# Patient Record
Sex: MALE | Race: WHITE | NOT HISPANIC OR LATINO | Employment: UNEMPLOYED | URBAN - METROPOLITAN AREA
[De-identification: names, ages, dates, MRNs, and addresses within clinical notes are randomized per-mention and may not be internally consistent; named-entity substitution may affect disease eponyms.]

---

## 2019-12-21 ENCOUNTER — HOSPITAL ENCOUNTER (INPATIENT)
Facility: OTHER | Age: 49
LOS: 1 days | Discharge: HOME | DRG: 918 | End: 2019-12-22
Attending: EMERGENCY MEDICINE | Admitting: EMERGENCY MEDICINE
Payer: COMMERCIAL

## 2019-12-21 DIAGNOSIS — S16.1XXA CERVICAL STRAIN, ACUTE, INITIAL ENCOUNTER: ICD-10-CM

## 2019-12-21 DIAGNOSIS — I48.91 ATRIAL FIBRILLATION: ICD-10-CM

## 2019-12-21 DIAGNOSIS — T14.90XA TRAUMA: ICD-10-CM

## 2019-12-21 DIAGNOSIS — R00.2 PALPITATION: ICD-10-CM

## 2019-12-21 DIAGNOSIS — R00.0 TACHYCARDIA: ICD-10-CM

## 2019-12-21 DIAGNOSIS — S09.90XA INJURY OF HEAD, INITIAL ENCOUNTER: ICD-10-CM

## 2019-12-21 DIAGNOSIS — I48.91 ATRIAL FIBRILLATION WITH RAPID VENTRICULAR RESPONSE: Primary | ICD-10-CM

## 2019-12-21 LAB
ALBUMIN SERPL BCP-MCNC: 4.1 G/DL (ref 3.5–5.2)
ALP SERPL-CCNC: 73 U/L (ref 55–135)
ALT SERPL W/O P-5'-P-CCNC: 61 U/L (ref 10–44)
AMPHET+METHAMPHET UR QL: NEGATIVE
ANION GAP SERPL CALC-SCNC: 18 MMOL/L (ref 8–16)
AST SERPL-CCNC: 31 U/L (ref 10–40)
BARBITURATES UR QL SCN>200 NG/ML: NEGATIVE
BASOPHILS # BLD AUTO: 0.06 K/UL (ref 0–0.2)
BASOPHILS NFR BLD: 0.6 % (ref 0–1.9)
BENZODIAZ UR QL SCN>200 NG/ML: NEGATIVE
BILIRUB SERPL-MCNC: 0.4 MG/DL (ref 0.1–1)
BNP SERPL-MCNC: <10 PG/ML (ref 0–99)
BUN SERPL-MCNC: 17 MG/DL (ref 6–20)
BZE UR QL SCN: NEGATIVE
CALCIUM SERPL-MCNC: 9.7 MG/DL (ref 8.7–10.5)
CANNABINOIDS UR QL SCN: NEGATIVE
CHLORIDE SERPL-SCNC: 105 MMOL/L (ref 95–110)
CO2 SERPL-SCNC: 22 MMOL/L (ref 23–29)
CREAT SERPL-MCNC: 1.1 MG/DL (ref 0.5–1.4)
CREAT UR-MCNC: 112 MG/DL (ref 23–375)
DIFFERENTIAL METHOD: ABNORMAL
EOSINOPHIL # BLD AUTO: 0.3 K/UL (ref 0–0.5)
EOSINOPHIL NFR BLD: 3.6 % (ref 0–8)
ERYTHROCYTE [DISTWIDTH] IN BLOOD BY AUTOMATED COUNT: 12.6 % (ref 11.5–14.5)
EST. GFR  (AFRICAN AMERICAN): >60 ML/MIN/1.73 M^2
EST. GFR  (NON AFRICAN AMERICAN): >60 ML/MIN/1.73 M^2
ETHANOL SERPL-MCNC: 46 MG/DL
GLUCOSE SERPL-MCNC: 116 MG/DL (ref 70–110)
HCT VFR BLD AUTO: 48.1 % (ref 40–54)
HGB BLD-MCNC: 16.6 G/DL (ref 14–18)
IMM GRANULOCYTES # BLD AUTO: 0.02 K/UL (ref 0–0.04)
IMM GRANULOCYTES NFR BLD AUTO: 0.2 % (ref 0–0.5)
LYMPHOCYTES # BLD AUTO: 3.6 K/UL (ref 1–4.8)
LYMPHOCYTES NFR BLD: 37.6 % (ref 18–48)
MCH RBC QN AUTO: 32 PG (ref 27–31)
MCHC RBC AUTO-ENTMCNC: 34.5 G/DL (ref 32–36)
MCV RBC AUTO: 93 FL (ref 82–98)
METHADONE UR QL SCN>300 NG/ML: NEGATIVE
MONOCYTES # BLD AUTO: 0.9 K/UL (ref 0.3–1)
MONOCYTES NFR BLD: 9.2 % (ref 4–15)
NEUTROPHILS # BLD AUTO: 4.7 K/UL (ref 1.8–7.7)
NEUTROPHILS NFR BLD: 48.8 % (ref 38–73)
NRBC BLD-RTO: 0 /100 WBC
OPIATES UR QL SCN: NEGATIVE
PCP UR QL SCN>25 NG/ML: NEGATIVE
PLATELET # BLD AUTO: 283 K/UL (ref 150–350)
PMV BLD AUTO: 9.5 FL (ref 9.2–12.9)
POTASSIUM SERPL-SCNC: 4.1 MMOL/L (ref 3.5–5.1)
PROT SERPL-MCNC: 7.2 G/DL (ref 6–8.4)
RBC # BLD AUTO: 5.18 M/UL (ref 4.6–6.2)
SODIUM SERPL-SCNC: 145 MMOL/L (ref 136–145)
TOXICOLOGY INFORMATION: NORMAL
TROPONIN I SERPL DL<=0.01 NG/ML-MCNC: 0.02 NG/ML (ref 0–0.03)
TROPONIN I SERPL DL<=0.01 NG/ML-MCNC: 0.1 NG/ML (ref 0–0.03)
TSH SERPL DL<=0.005 MIU/L-ACNC: 2.13 UIU/ML (ref 0.4–4)
WBC # BLD AUTO: 9.53 K/UL (ref 3.9–12.7)

## 2019-12-21 PROCEDURE — 84484 ASSAY OF TROPONIN QUANT: CPT | Mod: 91

## 2019-12-21 PROCEDURE — 83880 ASSAY OF NATRIURETIC PEPTIDE: CPT

## 2019-12-21 PROCEDURE — 84443 ASSAY THYROID STIM HORMONE: CPT

## 2019-12-21 PROCEDURE — 20000000 HC ICU ROOM

## 2019-12-21 PROCEDURE — 80320 DRUG SCREEN QUANTALCOHOLS: CPT

## 2019-12-21 PROCEDURE — 96374 THER/PROPH/DIAG INJ IV PUSH: CPT

## 2019-12-21 PROCEDURE — 99223 1ST HOSP IP/OBS HIGH 75: CPT | Mod: ,,, | Performed by: NURSE PRACTITIONER

## 2019-12-21 PROCEDURE — 63600175 PHARM REV CODE 636 W HCPCS: Performed by: NURSE PRACTITIONER

## 2019-12-21 PROCEDURE — 96361 HYDRATE IV INFUSION ADD-ON: CPT

## 2019-12-21 PROCEDURE — 25000003 PHARM REV CODE 250: Performed by: EMERGENCY MEDICINE

## 2019-12-21 PROCEDURE — 25000003 PHARM REV CODE 250: Performed by: NURSE PRACTITIONER

## 2019-12-21 PROCEDURE — 94761 N-INVAS EAR/PLS OXIMETRY MLT: CPT

## 2019-12-21 PROCEDURE — 80053 COMPREHEN METABOLIC PANEL: CPT

## 2019-12-21 PROCEDURE — 85025 COMPLETE CBC W/AUTO DIFF WBC: CPT

## 2019-12-21 PROCEDURE — 80307 DRUG TEST PRSMV CHEM ANLYZR: CPT

## 2019-12-21 PROCEDURE — 93010 ELECTROCARDIOGRAM REPORT: CPT | Mod: ,,, | Performed by: INTERNAL MEDICINE

## 2019-12-21 PROCEDURE — 93005 ELECTROCARDIOGRAM TRACING: CPT

## 2019-12-21 PROCEDURE — 84484 ASSAY OF TROPONIN QUANT: CPT

## 2019-12-21 PROCEDURE — 36415 COLL VENOUS BLD VENIPUNCTURE: CPT

## 2019-12-21 PROCEDURE — 99223 PR INITIAL HOSPITAL CARE,LEVL III: ICD-10-PCS | Mod: ,,, | Performed by: NURSE PRACTITIONER

## 2019-12-21 PROCEDURE — 63600175 PHARM REV CODE 636 W HCPCS: Performed by: EMERGENCY MEDICINE

## 2019-12-21 PROCEDURE — 99291 CRITICAL CARE FIRST HOUR: CPT | Mod: 25

## 2019-12-21 PROCEDURE — 93010 EKG 12-LEAD: ICD-10-PCS | Mod: ,,, | Performed by: INTERNAL MEDICINE

## 2019-12-21 PROCEDURE — 96375 TX/PRO/DX INJ NEW DRUG ADDON: CPT

## 2019-12-21 RX ORDER — DESVENLAFAXINE 100 MG/1
100 TABLET, EXTENDED RELEASE ORAL DAILY
COMMUNITY

## 2019-12-21 RX ORDER — DILTIAZEM HYDROCHLORIDE 5 MG/ML
5 INJECTION INTRAVENOUS
Status: COMPLETED | OUTPATIENT
Start: 2019-12-21 | End: 2019-12-21

## 2019-12-21 RX ORDER — DESVENLAFAXINE SUCCINATE 50 MG/1
100 TABLET, EXTENDED RELEASE ORAL DAILY
Status: DISCONTINUED | OUTPATIENT
Start: 2019-12-22 | End: 2019-12-22 | Stop reason: HOSPADM

## 2019-12-21 RX ORDER — SODIUM CHLORIDE 0.9 % (FLUSH) 0.9 %
10 SYRINGE (ML) INJECTION
Status: DISCONTINUED | OUTPATIENT
Start: 2019-12-21 | End: 2019-12-22 | Stop reason: HOSPADM

## 2019-12-21 RX ORDER — ASPIRIN 325 MG
325 TABLET ORAL
Status: COMPLETED | OUTPATIENT
Start: 2019-12-21 | End: 2019-12-21

## 2019-12-21 RX ORDER — ENOXAPARIN SODIUM 100 MG/ML
1 INJECTION SUBCUTANEOUS EVERY 12 HOURS
Status: DISCONTINUED | OUTPATIENT
Start: 2019-12-21 | End: 2019-12-22

## 2019-12-21 RX ORDER — ACETAMINOPHEN 325 MG/1
650 TABLET ORAL EVERY 4 HOURS PRN
Status: DISCONTINUED | OUTPATIENT
Start: 2019-12-21 | End: 2019-12-22 | Stop reason: HOSPADM

## 2019-12-21 RX ORDER — DILTIAZEM HCL 1 MG/ML
5 INJECTION, SOLUTION INTRAVENOUS
Status: COMPLETED | OUTPATIENT
Start: 2019-12-21 | End: 2019-12-21

## 2019-12-21 RX ORDER — ONDANSETRON 8 MG/1
8 TABLET, ORALLY DISINTEGRATING ORAL EVERY 8 HOURS PRN
Status: DISCONTINUED | OUTPATIENT
Start: 2019-12-21 | End: 2019-12-22 | Stop reason: HOSPADM

## 2019-12-21 RX ORDER — METOPROLOL TARTRATE 1 MG/ML
10 INJECTION, SOLUTION INTRAVENOUS
Status: COMPLETED | OUTPATIENT
Start: 2019-12-21 | End: 2019-12-21

## 2019-12-21 RX ORDER — METOPROLOL TARTRATE 1 MG/ML
INJECTION, SOLUTION INTRAVENOUS
Status: DISPENSED
Start: 2019-12-21 | End: 2019-12-22

## 2019-12-21 RX ORDER — DILTIAZEM HCL 1 MG/ML
5 INJECTION, SOLUTION INTRAVENOUS CONTINUOUS
Status: DISCONTINUED | OUTPATIENT
Start: 2019-12-21 | End: 2019-12-22

## 2019-12-21 RX ORDER — METOPROLOL TARTRATE 1 MG/ML
5 INJECTION, SOLUTION INTRAVENOUS
Status: COMPLETED | OUTPATIENT
Start: 2019-12-21 | End: 2019-12-21

## 2019-12-21 RX ADMIN — SODIUM CHLORIDE 1000 ML: 0.9 INJECTION, SOLUTION INTRAVENOUS at 03:12

## 2019-12-21 RX ADMIN — ASPIRIN 325 MG ORAL TABLET 325 MG: 325 PILL ORAL at 03:12

## 2019-12-21 RX ADMIN — Medication 2.5 MG/HR: at 07:12

## 2019-12-21 RX ADMIN — METOPROLOL TARTRATE 5 MG: 1 INJECTION, SOLUTION INTRAVENOUS at 03:12

## 2019-12-21 RX ADMIN — ENOXAPARIN SODIUM 80 MG: 100 INJECTION SUBCUTANEOUS at 08:12

## 2019-12-21 RX ADMIN — DILTIAZEM HYDROCHLORIDE 5 MG: 5 INJECTION INTRAVENOUS at 05:12

## 2019-12-21 RX ADMIN — METOPROLOL TARTRATE 10 MG: 1 INJECTION, SOLUTION INTRAVENOUS at 04:12

## 2019-12-21 NOTE — ED PROVIDER NOTES
Encounter Date: 12/21/2019    SCRIBE #1 NOTE: I, Андрей Mcguiren, am scribing for, and in the presence of,  Dr. Anne. I have scribed the entire note.       History     Chief Complaint   Patient presents with    Palpitations     Pt reports palpitations since this morning. 202 HR noted in triage.      Time patient was seen by the provider: 3:39 PM    The patient is a 49 y.o. male who presents to the ED with a complaint of palpations today. He reports he was eating lunch today when he felt his heart palpations start. He took a nap with no relief of his symptoms so he came here. He does not smoke or endorse any drug use. He has an abrasion on his forehead and states he fell last night. He denies any loss of consciousness. He is not complaining of a headache. He did not drink coffee or take any caffeine pills.  He denies fever, chills, cough, congestion, or any other symptoms.     The history is provided by the patient.     Review of patient's allergies indicates:  Allergies not on file  No past medical history on file.  No past surgical history on file.  No family history on file.  Social History     Tobacco Use    Smoking status: Not on file   Substance Use Topics    Alcohol use: Not on file    Drug use: Not on file     Review of Systems   Constitutional: Negative for chills and fever.   HENT: Negative for congestion and sore throat.    Eyes: Negative for photophobia and redness.   Respiratory: Negative for cough and shortness of breath.    Cardiovascular: Positive for palpitations.   Gastrointestinal: Negative for abdominal pain, nausea and vomiting.   Genitourinary: Negative for difficulty urinating and dysuria.   Musculoskeletal: Negative for back pain and neck pain.   Skin: Negative for rash.   Neurological: Negative for weakness, light-headedness and headaches.   Psychiatric/Behavioral: Negative for confusion.       Physical Exam     Initial Vitals [12/21/19 1530]   BP Pulse Resp Temp SpO2   (!) 144/77 (!) 183  20 98.4 °F (36.9 °C) 96 %      MAP       --         Physical Exam    Nursing note and vitals reviewed.  Constitutional: He appears well-developed and well-nourished. He is not diaphoretic. No distress.   HENT:   Head: Normocephalic and atraumatic.   Mouth/Throat: Oropharynx is clear and moist.   Moist mucus membranes. TMs clear and intact bilaterally.    Eyes: Conjunctivae and EOM are normal. Pupils are equal, round, and reactive to light.   Conjunctivae pink, clear, and intact.    Neck: Normal range of motion. Neck supple.   No cervical lymphadenopathy.    Cardiovascular: Normal rate, regular rhythm, S1 normal, S2 normal and normal heart sounds. Exam reveals no gallop and no friction rub.    No murmur heard.  Pulmonary/Chest: Breath sounds normal. No respiratory distress. He has no wheezes. He has no rhonchi. He has no rales.   Lungs clear to auscultation bilaterally.    Abdominal: Soft. Bowel sounds are normal. There is no tenderness. There is no rebound and no guarding.   No audible bruits.    Musculoskeletal: Normal range of motion. He exhibits no edema or tenderness.   No lower extremity edema. No midline T-L-spine tenderness to palpation, crepitus or step-offs. Bilateral paraspinal tenderness to left of C4 and C5.      Lymphadenopathy:     He has no cervical adenopathy.   Neurological: He is alert and oriented to person, place, and time.   Skin: Skin is warm and dry. Capillary refill takes less than 2 seconds. No rash noted. No pallor.   Warm and dry. No skin tenting, rashes, or lesions.     Psychiatric: He has a normal mood and affect. His behavior is normal. Judgment and thought content normal.         ED Course   Critical Care  Date/Time: 12/21/2019 3:40 PM  Performed by: Winston Anne MD  Authorized by: Winston Anne MD   Direct patient critical care time: 45 minutes  Additional history critical care time: 10 minutes  Ordering / reviewing critical care time: 8 minutes  Documentation critical care  time: 10 minutes  Consulting other physicians critical care time: 8 minutes  Consult with family critical care time: 10 minutes  Total critical care time (exclusive of procedural time) : 91 minutes  Critical care time was exclusive of separately billable procedures and treating other patients and teaching time.  Critical care was necessary to treat or prevent imminent or life-threatening deterioration of the following conditions: Atrial fibrillation with rapid ventricular response.  Critical care was time spent personally by me on the following activities: blood draw for specimens, development of treatment plan with patient or surrogate, discussions with consultants, interpretation of cardiac output measurements, evaluation of patient's response to treatment, examination of patient, obtaining history from patient or surrogate, ordering and performing treatments and interventions, ordering and review of laboratory studies, ordering and review of radiographic studies, pulse oximetry, re-evaluation of patient's condition and review of old charts.        Labs Reviewed - No data to display  EKG Readings: (Independently Interpreted)   Initial Reading: No STEMI. Heart Rate: 151.   AFIB with rapid ventricular response. Normal QRS duration.    Repeat after cortisone 5:32 PM : AFIB with RVR. Ventricular rate 101. No stemi. Normal QRS duration.       Imaging Results    None       X-Rays:   Independently Interpreted Readings:   Chest X-Ray: No pulmonary edema. No pleural effusion. No enlarged cardiac silhouette. No acute findings visualized.    Other Readings:  Cervical spine: No acute fracture or dislocation    Medical Decision Making:   History:   Old Medical Records: I decided to obtain old medical records.  Independently Interpreted Test(s):   I have ordered and independently interpreted X-rays - see prior notes.  I have ordered and independently interpreted EKG Reading(s) - see prior notes  Clinical Tests:   Lab Tests:  Ordered and Reviewed  Radiological Study: Ordered and Reviewed  Medical Tests: Ordered and Reviewed  Other:   I have discussed this case with another health care provider.       <> Summary of the Discussion:   5:29 PM   Spoke to Dr. Duran. Patient will go to Dr. Negron. Requested to put on cortisone drip and will go to ICU.            Scribe Attestation:   Scribe #1: I performed the above scribed service and the documentation accurately describes the services I performed. I attest to the accuracy of the note.    Attending Attestation:           Physician Attestation for Scribe:  Physician Attestation Statement for Scribe #1: I, Dr. Anne, reviewed documentation, as scribed by Андрей King in my presence, and it is both accurate and complete.         Attending ED Notes:   Emergent evaluation a 49-year-old male with a chief complaint of palpitations.  Patient is afebrile, nontoxic-appearing stable vital signs except for tachycardia.  EKG reveals atrial fibrillation with rapid ventricular response.  Patient states he has no history of atrial fibrillation.  Patient has no elevation white blood cell count.  H&H within normal limits. No acute findings on CMP.  Troponin is within normal limits. No elevation in BNP.  TSH is within normal limits.  Alcohol level is 46.  During the course of evaluation patient states that he did fall and hit his head last night.  No LOC.  Patient denies any headache.  No head pain.  GCS of 15.  X-ray of the cervical spine reveals no acute fractures or dislocations.  No acute findings on chest x-ray.  IV Lopressor and Cardizem kept patient rate controlled in the emergency room.  The patient is extensively counseled on his diagnosis and treatment including all diagnostic, laboratory and physical exam findings.  The patient is admitted in stable condition.                        Clinical Impression:       ICD-10-CM ICD-9-CM   1. Palpitation R00.2 785.1                             Winston STEWART  MD [place]  12/21/19 1921

## 2019-12-21 NOTE — ED TRIAGE NOTES
Pt reports to ED via POV c/o palpitations upon waking up this morning, denies chest pain or SOB. Pt in town from [place] and admits to drinking alcohol heavily last night. Pt also has abrasion noted to left side of forehead, pt states he had a trip and fall last night. Denies LOC, vision changes or HA since fall. Pt does admit right-sided neck pain since fall.     Denies significant PMH except depression, pt currently  Patient identifiers for Sergio Hankins Bear checked and correct.  LOC: Patient is awake, alert, and aware of environment with an appropriate affect. Patient is oriented x 3 and speaking appropriately.  APPEARANCE: Patient resting comfortably and in no acute distress. Patient is clean and well groomed, patient's clothing is properly fastened.  SKIN: The skin is warm and dry. Patient has normal skin turgor and moist mucus membrances. Skin is intact; no bruising or breakdown noted.  MUSKULOSKELETAL: Patient is moving all extremities well, no obvious swelling or deformities noted. Pulses intact.   RESPIRATORY: Airway is open and patent. Respirations are spontaneous, even and unlabored. Normal effort and rate noted.  CARDIAC: AFIB with RVR on monitor when pt initially arrived to ED. No peripheral edema noted. Capillary refill < 3 seconds.    Allergies reported:   Review of patient's allergies indicates:   Allergen Reactions    Penicillins     takes prestiq daily.

## 2019-12-21 NOTE — ASSESSMENT & PLAN NOTE
Patient presented with HR of 202.  Was given IV Lopressor and Cardizem.  Currently, rate controlled atrial fib.    Cardizem drip  Consult Cardiology  Lovenox BID

## 2019-12-21 NOTE — HPI
Mr. Schneider is a 49 year old man who presented to the ED with a complaint of palpations that started while he was eating lunch today.  He took a nap with no relief of his symptoms so he came here. He does not smoke or endorse any drug use. He doesn't use alcohol excessively but did drink a lot the night before symptoms started, and alcohol level was 46 on presentation here. He has an abrasion on his forehead and states he fell last night with no loss of consciousness and no headache. He did not drink coffee or take any caffeine pills. He denies fever, chills, cough, congestion, or any other symptoms.  HR was 183 on presentation here and he was given metoprolol and diltiazem IV several time with improvement in HR but he remained in atrial fibrillation.  He given full dose Lovenox and was transferred to the ICU on a diltiazem drip.

## 2019-12-22 VITALS
HEART RATE: 66 BPM | DIASTOLIC BLOOD PRESSURE: 84 MMHG | SYSTOLIC BLOOD PRESSURE: 128 MMHG | TEMPERATURE: 98 F | OXYGEN SATURATION: 96 % | BODY MASS INDEX: 28.71 KG/M2 | WEIGHT: 193.81 LBS | HEIGHT: 69 IN | RESPIRATION RATE: 18 BRPM

## 2019-12-22 LAB
ALBUMIN SERPL BCP-MCNC: 3.6 G/DL (ref 3.5–5.2)
ALP SERPL-CCNC: 69 U/L (ref 55–135)
ALT SERPL W/O P-5'-P-CCNC: 53 U/L (ref 10–44)
ANION GAP SERPL CALC-SCNC: 11 MMOL/L (ref 8–16)
AST SERPL-CCNC: 30 U/L (ref 10–40)
BASOPHILS # BLD AUTO: 0.07 K/UL (ref 0–0.2)
BASOPHILS NFR BLD: 0.7 % (ref 0–1.9)
BILIRUB SERPL-MCNC: 0.5 MG/DL (ref 0.1–1)
BUN SERPL-MCNC: 15 MG/DL (ref 6–20)
CALCIUM SERPL-MCNC: 9.1 MG/DL (ref 8.7–10.5)
CHLORIDE SERPL-SCNC: 106 MMOL/L (ref 95–110)
CO2 SERPL-SCNC: 22 MMOL/L (ref 23–29)
CREAT SERPL-MCNC: 0.9 MG/DL (ref 0.5–1.4)
DIFFERENTIAL METHOD: ABNORMAL
EOSINOPHIL # BLD AUTO: 0.5 K/UL (ref 0–0.5)
EOSINOPHIL NFR BLD: 4.9 % (ref 0–8)
ERYTHROCYTE [DISTWIDTH] IN BLOOD BY AUTOMATED COUNT: 12.7 % (ref 11.5–14.5)
EST. GFR  (AFRICAN AMERICAN): >60 ML/MIN/1.73 M^2
EST. GFR  (NON AFRICAN AMERICAN): >60 ML/MIN/1.73 M^2
GLUCOSE SERPL-MCNC: 99 MG/DL (ref 70–110)
HCT VFR BLD AUTO: 45.3 % (ref 40–54)
HGB BLD-MCNC: 15.5 G/DL (ref 14–18)
IMM GRANULOCYTES # BLD AUTO: 0.04 K/UL (ref 0–0.04)
IMM GRANULOCYTES NFR BLD AUTO: 0.4 % (ref 0–0.5)
LYMPHOCYTES # BLD AUTO: 3.7 K/UL (ref 1–4.8)
LYMPHOCYTES NFR BLD: 35.6 % (ref 18–48)
MAGNESIUM SERPL-MCNC: 1.5 MG/DL (ref 1.6–2.6)
MCH RBC QN AUTO: 31.8 PG (ref 27–31)
MCHC RBC AUTO-ENTMCNC: 34.2 G/DL (ref 32–36)
MCV RBC AUTO: 93 FL (ref 82–98)
MONOCYTES # BLD AUTO: 0.9 K/UL (ref 0.3–1)
MONOCYTES NFR BLD: 8.7 % (ref 4–15)
NEUTROPHILS # BLD AUTO: 5.2 K/UL (ref 1.8–7.7)
NEUTROPHILS NFR BLD: 49.7 % (ref 38–73)
NRBC BLD-RTO: 0 /100 WBC
PHOSPHATE SERPL-MCNC: 2.9 MG/DL (ref 2.7–4.5)
PLATELET # BLD AUTO: 213 K/UL (ref 150–350)
PMV BLD AUTO: 9.3 FL (ref 9.2–12.9)
POCT GLUCOSE: 173 MG/DL (ref 70–110)
POTASSIUM SERPL-SCNC: 4.3 MMOL/L (ref 3.5–5.1)
PROT SERPL-MCNC: 6.4 G/DL (ref 6–8.4)
RBC # BLD AUTO: 4.88 M/UL (ref 4.6–6.2)
SODIUM SERPL-SCNC: 139 MMOL/L (ref 136–145)
WBC # BLD AUTO: 10.43 K/UL (ref 3.9–12.7)

## 2019-12-22 PROCEDURE — 25000003 PHARM REV CODE 250: Performed by: NURSE PRACTITIONER

## 2019-12-22 PROCEDURE — 99238 HOSP IP/OBS DSCHRG MGMT 30/<: CPT | Mod: ,,, | Performed by: HOSPITALIST

## 2019-12-22 PROCEDURE — 80053 COMPREHEN METABOLIC PANEL: CPT

## 2019-12-22 PROCEDURE — 25000003 PHARM REV CODE 250: Performed by: INTERNAL MEDICINE

## 2019-12-22 PROCEDURE — 36415 COLL VENOUS BLD VENIPUNCTURE: CPT

## 2019-12-22 PROCEDURE — 84100 ASSAY OF PHOSPHORUS: CPT

## 2019-12-22 PROCEDURE — 90686 IIV4 VACC NO PRSV 0.5 ML IM: CPT | Performed by: INTERNAL MEDICINE

## 2019-12-22 PROCEDURE — 83735 ASSAY OF MAGNESIUM: CPT

## 2019-12-22 PROCEDURE — 99238 PR HOSPITAL DISCHARGE DAY,<30 MIN: ICD-10-PCS | Mod: ,,, | Performed by: HOSPITALIST

## 2019-12-22 PROCEDURE — 63600175 PHARM REV CODE 636 W HCPCS: Performed by: INTERNAL MEDICINE

## 2019-12-22 PROCEDURE — 25000003 PHARM REV CODE 250: Performed by: HOSPITALIST

## 2019-12-22 PROCEDURE — 90471 IMMUNIZATION ADMIN: CPT | Performed by: INTERNAL MEDICINE

## 2019-12-22 PROCEDURE — 94761 N-INVAS EAR/PLS OXIMETRY MLT: CPT

## 2019-12-22 PROCEDURE — 85025 COMPLETE CBC W/AUTO DIFF WBC: CPT

## 2019-12-22 RX ORDER — TALC
6 POWDER (GRAM) TOPICAL NIGHTLY PRN
Status: DISCONTINUED | OUTPATIENT
Start: 2019-12-22 | End: 2019-12-22 | Stop reason: HOSPADM

## 2019-12-22 RX ORDER — METOPROLOL SUCCINATE 25 MG/1
25 TABLET, EXTENDED RELEASE ORAL DAILY
Qty: 30 TABLET | Refills: 0 | Status: SHIPPED | OUTPATIENT
Start: 2019-12-22 | End: 2020-12-21

## 2019-12-22 RX ORDER — METOPROLOL TARTRATE 25 MG/1
25 TABLET, FILM COATED ORAL 2 TIMES DAILY
Status: DISCONTINUED | OUTPATIENT
Start: 2019-12-22 | End: 2019-12-22 | Stop reason: HOSPADM

## 2019-12-22 RX ORDER — ENOXAPARIN SODIUM 100 MG/ML
40 INJECTION SUBCUTANEOUS EVERY 24 HOURS
Status: DISCONTINUED | OUTPATIENT
Start: 2019-12-22 | End: 2019-12-22 | Stop reason: HOSPADM

## 2019-12-22 RX ADMIN — DESVENLAFAXINE SUCCINATE 100 MG: 50 TABLET, EXTENDED RELEASE ORAL at 10:12

## 2019-12-22 RX ADMIN — METOPROLOL TARTRATE 25 MG: 25 TABLET ORAL at 10:12

## 2019-12-22 RX ADMIN — Medication 6 MG: at 12:12

## 2019-12-22 RX ADMIN — INFLUENZA VIRUS VACCINE 0.5 ML: 15; 15; 15; 15 SUSPENSION INTRAMUSCULAR at 11:12

## 2019-12-22 NOTE — CONSULTS
"[facility]  Consult    History of Present Illness:  Mr. Schneider is a 49-year-old gentleman who had the onset of palpitations since the afternoon of admission.  The day before, he had been drinking wine and bourbon, "excessively".     PTA Medications   Medication Sig    desvenlafaxine succinate (PRISTIQ) 100 MG Tb24 Take 100 mg by mouth once daily.       Review of patient's allergies indicates:   Allergen Reactions    Penicillins        History reviewed. No pertinent past medical history.  History reviewed. No pertinent surgical history.  History reviewed. No pertinent family history.  Social History     Tobacco Use    Smoking status: Never Smoker    Smokeless tobacco: Never Used   Substance Use Topics    Alcohol use: Yes     Comment: socially    Drug use: Never        Physical Exam:  Carotid upstroke is brisk.  No carotid bruit  The chest is clear  The heart is regular.  No murmur or gallop.  The abdomen is soft and nontender  The bowel sounds are normal.  The extremities are warm.  No pedal edema.  The neurological exam is intact.      Impression:  New onset atrial fibrillation after heavy alcohol consumption, a "Holiday Heart".  He is now in sinus rhythm and feels well.  Will check a bedside echocardiogram, plan discharge on oral metoprolol, and have him see a cardiologist for follow-up upon his return to [place]    Thank you for the opportunity of seeing Sergio Schneider in consultation  "

## 2019-12-22 NOTE — DISCHARGE SUMMARY
[facility] Hospital Medicine  Discharge Summary      Patient Name: Sergio Schneider  MRN: [id number]  Admission Date: 12/21/2019  Hospital Length of Stay: 1 days  Discharge Date and Time: 12/22/2019 11:57 AM  Attending Physician: Dorothy att. providers found   Discharging Provider: Carolyn Campbell MD  Primary Care Provider: Primary Doctor Dorothy      HPI:   Mr. Schneider is a 49 year old man who presented to the ED with a complaint of palpations that started while he was eating lunch today.  He took a nap with no relief of his symptoms so he came here. He does not smoke or endorse any drug use. He doesn't use alcohol excessively but did drink a lot the night before symptoms started, and alcohol level was 46 on presentation here. He has an abrasion on his forehead and states he fell last night with no loss of consciousness and no headache. He did not drink coffee or take any caffeine pills. He denies fever, chills, cough, congestion, or any other symptoms.  HR was 183 on presentation here and he was given metoprolol and diltiazem IV several time with improvement in HR but he remained in atrial fibrillation.  He given full dose Lovenox and was transferred to the ICU on a diltiazem drip.            Hospital Course:   Patient was hospitalized in the ICU on a diltiazem drip.  He remained in atrial fibrillation for several hours before converting to NSR several time, then would revert to atrial fibrillation.  By the morning 12/22 he was in NSR and the drip was discontinued and he was started on metoprolol 25 mg bid.  He was seen by cardiology, and a 2D echo was done that on preliminary reading showed no abnormalities.  He was feeling well on discharge and will follow up with a cardiologist when he returns to [place].     Consults:   Consults (From admission, onward)        Status Ordering Provider     Inpatient consult to Cardiology  Once     Provider:  Benjamin Morgan MD    Completed [facility]             Final Active Diagnoses:    Diagnosis Date Noted POA    PRINCIPAL PROBLEM:  Atrial fibrillation with rapid ventricular response [I48.91] 12/21/2019 Yes      Problems Resolved During this Admission:       Discharged Condition: stable    Disposition: Home or Self Care    Follow Up:    Patient Instructions:      Diet Adult Regular     Activity as tolerated         Medications:  Reconciled Home Medications:      Medication List      START taking these medications    metoprolol succinate 25 MG 24 hr tablet  Commonly known as:  TOPROL-XL  Take 1 tablet (25 mg total) by mouth once daily.  Notes to patient:  First dose taken at 1015 AM 12/22/2019        CONTINUE taking these medications    Pristiq 100 MG Tb24  Generic drug:  desvenlafaxine succinate  Take 100 mg by mouth once daily.            Time spent on the discharge of patient: <30 minutes  Patient was seen and examined on the date of discharge and determined to be suitable for discharge.         Carolyn Campbell MD  Department of Hospital Medicine  [facility]

## 2019-12-22 NOTE — PLAN OF CARE
POC reviewed with physicians and pt and spouse at bedside.  No falls or injuries this shift.  Patient rhythm converted to NSR this morning.  Started on beta blocker and plan to DC to home after echocardiogram done.

## 2019-12-22 NOTE — NURSING
Pt arrived to unit via stretcher with nurse assist, AAO, no acute distress, denies CP, SOB. Pt placed on monitor , HR noted to be 100-110's. BP WNL (see flowsheet)PIV x2, saline locked, c/d/i.  Pt wife to bedside. Pt a wife updated on pt's plan of care and verbalized understanding. Will handoff to night nurse

## 2019-12-22 NOTE — SUBJECTIVE & OBJECTIVE
Interval History:  Patient converted to NSR with HR in 70's.  Off diltiazem drip.  Admits to drinking heavily the night before symptoms started, something he does not typically do and his wife at bedside confirms this.    Review of Systems   Constitutional: Negative for chills and fever.   Respiratory: Negative for cough and shortness of breath.    Cardiovascular: Negative for chest pain, palpitations and leg swelling.     Objective:     Vital Signs (Most Recent):  Temp: 98.2 °F (36.8 °C) (12/22/19 0800)  Pulse: 72 (12/22/19 0900)  Resp: 19 (12/22/19 0855)  BP: 120/81 (12/22/19 0855)  SpO2: 98 % (12/22/19 0900) Vital Signs (24h Range):  Temp:  [97.8 °F (36.6 °C)-98.6 °F (37 °C)] 98.2 °F (36.8 °C)  Pulse:  [] 72  Resp:  [12-22] 19  SpO2:  [96 %-99 %] 98 %  BP: (107-182)/() 120/81     Weight: 87.9 kg (193 lb 12.6 oz)  Body mass index is 28.62 kg/m².    Intake/Output Summary (Last 24 hours) at 12/22/2019 1023  Last data filed at 12/22/2019 0500  Gross per 24 hour   Intake 1005.96 ml   Output 850 ml   Net 155.96 ml      Physical Exam   Constitutional: He is oriented to person, place, and time. He appears well-developed and well-nourished.   HENT:   Head: Normocephalic.   Eyes: Pupils are equal, round, and reactive to light. Conjunctivae are normal.   Neck: Neck supple. No thyromegaly present.   Cardiovascular: Normal rate, regular rhythm, normal heart sounds and intact distal pulses. Exam reveals no gallop and no friction rub.   No murmur heard.  Pulmonary/Chest: Effort normal and breath sounds normal.   Abdominal: Soft. Bowel sounds are normal. He exhibits no distension. There is no tenderness.   Musculoskeletal: Normal range of motion. He exhibits no edema.   Lymphadenopathy:     He has no cervical adenopathy.   Neurological: He is alert and oriented to person, place, and time.   Strength equal and symmetric   Skin: Skin is warm and dry. No rash noted.   Psychiatric: He has a normal mood and affect. His  behavior is normal. Thought content normal.       Significant Labs: All pertinent labs within the past 24 hours have been reviewed.    Significant Imaging: I have reviewed all pertinent imaging results/findings within the past 24 hours.

## 2019-12-22 NOTE — PLAN OF CARE
Patient discharge before assessment complete.  Patient discharge with no needs.       12/22/19 3902   Discharge Assessment   Assessment Type Discharge Planning Assessment

## 2019-12-22 NOTE — NURSING
Patient given flu vaccine in left deltoid per patient request.  Awaiting echocardiogram to be performed then expect to discharge home with wife.

## 2019-12-22 NOTE — DISCHARGE INSTRUCTIONS
See primary care physician once in home town and get referred to a cardiologist.    [employer]o Fluarix vaccine given in left deltoid, Lot [id number], exp 6/20/2020.

## 2019-12-22 NOTE — H&P
[facility] Hospital Medicine  History & Physical    Patient Name: Sergio Schneider  MRN: [id number]  Admission Date: 12/21/2019  Attending Physician: Ilda Negron MD   Primary Care Provider: Primary Doctor No         Patient information was obtained from patient, past medical records and ER records.     Subjective:     Principal Problem:Atrial fibrillation with rapid ventricular response    Chief Complaint:   Chief Complaint   Patient presents with    Palpitations     Pt reports palpitations since this morning. 202 HR noted in triage.         HPI: The patient is a 49 y.o. male who presents to the ED with a complaint of palpations today. He reports he was eating lunch today when he felt his heart palpations start. He took a nap with no relief of his symptoms so he came here. He does not smoke or endorse any drug use. He has an abrasion on his forehead and states he fell last night. He denies any loss of consciousness. He is not complaining of a headache. He did not drink coffee or take any caffeine pills. He denies fever, chills, cough, congestion, or any other symptoms.       History reviewed. No pertinent past medical history.    History reviewed. No pertinent surgical history.    Review of patient's allergies indicates:   Allergen Reactions    Penicillins        No current facility-administered medications on file prior to encounter.      Current Outpatient Medications on File Prior to Encounter   Medication Sig    desvenlafaxine succinate (PRISTIQ) 100 MG Tb24 Take 100 mg by mouth once daily.     Family History     None        Tobacco Use    Smoking status: Never Smoker    Smokeless tobacco: Never Used   Substance and Sexual Activity    Alcohol use: Yes     Comment: socially    Drug use: Never    Sexual activity: Not on file     Review of Systems   Constitutional: Positive for activity change and fatigue. Negative for appetite change and fever.   HENT: Negative for congestion, ear  pain and postnasal drip.    Eyes: Negative for discharge.   Respiratory: Negative for apnea, shortness of breath and wheezing.    Cardiovascular: Positive for chest pain and palpitations. Negative for leg swelling.   Gastrointestinal: Negative for abdominal distention, abdominal pain, nausea and vomiting.   Endocrine: Negative for polydipsia, polyphagia and polyuria.   Genitourinary: Negative for difficulty urinating, flank pain, frequency, hematuria and urgency.   Musculoskeletal: Positive for myalgias. Negative for arthralgias and joint swelling.   Skin: Negative for pallor and rash.   Allergic/Immunologic: Negative for environmental allergies and food allergies.   Neurological: Negative for dizziness, speech difficulty, weakness, light-headedness and headaches.   Hematological: Does not bruise/bleed easily.   Psychiatric/Behavioral: Negative for agitation.     Objective:     Vital Signs (Most Recent):  Temp: 98.4 °F (36.9 °C) (12/21/19 1530)  Pulse: 96 (12/21/19 1850)  Resp: 17 (12/21/19 1755)  BP: 126/78 (12/21/19 1850)  SpO2: 97 % (12/21/19 1850) Vital Signs (24h Range):  Temp:  [98.4 °F (36.9 °C)] 98.4 °F (36.9 °C)  Pulse:  [] 96  Resp:  [16-20] 17  SpO2:  [96 %-99 %] 97 %  BP: (107-171)/(58-97) 126/78     Weight: 87.9 kg (193 lb 12.6 oz)  Body mass index is 28.62 kg/m² (pended).    Physical Exam   Constitutional: He is oriented to person, place, and time. He appears well-developed and well-nourished.   HENT:   Head: Normocephalic.   Eyes: Conjunctivae are normal.   Neck: Normal range of motion. Neck supple.   Cardiovascular: Normal heart sounds and intact distal pulses. An irregularly irregular rhythm present. Tachycardia present.   Pulses:       Radial pulses are 2+ on the right side, and 2+ on the left side.   Pulmonary/Chest: Effort normal and breath sounds normal.   Abdominal: Soft. He exhibits no distension. Bowel sounds are decreased. There is no tenderness.   Musculoskeletal: Normal range of  motion.   Neurological: He is alert and oriented to person, place, and time. He has normal strength. GCS eye subscore is 4. GCS verbal subscore is 5. GCS motor subscore is 6.   Skin: Skin is warm and dry.   Psychiatric: He has a normal mood and affect. His speech is normal and behavior is normal.           Significant Labs:   CBC:   Recent Labs   Lab 12/21/19  1550   WBC 9.53   HGB 16.6   HCT 48.1        CMP:   Recent Labs   Lab 12/21/19  1550      K 4.1      CO2 22*   *   BUN 17   CREATININE 1.1   CALCIUM 9.7   PROT 7.2   ALBUMIN 4.1   BILITOT 0.4   ALKPHOS 73   AST 31   ALT 61*   ANIONGAP 18*   EGFRNONAA >60       Significant Imaging: I have reviewed all pertinent imaging results/findings within the past 24 hours.    Assessment/Plan:     * Atrial fibrillation with rapid ventricular response  Patient presented with HR of 202.  Was given IV Lopressor and Cardizem.  Currently, rate controlled atrial fib.    Cardizem drip  Consult Cardiology  Lovenox BID        VTE Risk Mitigation (From admission, onward)         Ordered     enoxaparin injection 80 mg  Every 12 hours      12/21/19 1834     Place sequential compression device  Until discontinued      12/21/19 1834     IP VTE HIGH RISK PATIENT  Once      12/21/19 1834                   Matt Meadows NP  Department of Hospital Medicine   [facility]

## 2019-12-22 NOTE — PLAN OF CARE
12/22/19 1438   Final Note   Assessment Type Final Discharge Note   Anticipated Discharge Disposition Home   What phone number can be called within the next 1-3 days to see how you are doing after discharge? [phone]   Right [facility] Referral Info   Post Acute Recommendation No Care

## 2019-12-22 NOTE — HOSPITAL COURSE
Patient was hospitalized in the ICU on a diltiazem drip.  He remained in atrial fibrillation for several hours before converting to NSR several time, then would revert to atrial fibrillation.  By the morning 12/22 he was in NSR and the drip was discontinued and he was started on metoprolol 25 mg bid.  He was seen by cardiology, and a 2D echo was done that on preliminary reading showed no abnormalities.  He was feeling well on discharge and will follow up with a cardiologist when he returns to [place].

## 2019-12-22 NOTE — PROGRESS NOTES
[facility] Medicine  Progress Note    Patient Name: Sergio Schneider  MRN: [id number]  Patient Class: IP- Inpatient   Admission Date: 12/21/2019  Length of Stay: 1 days  Attending Physician: Carolyn Duran MD  Primary Care Provider: Primary Doctor No        Subjective:     Principal Problem:Atrial fibrillation with rapid ventricular response        HPI:  Mr. Schneider is a 49 year old man who presented to the ED with a complaint of palpations that started while he was eating lunch today.  He took a nap with no relief of his symptoms so he came here. He does not smoke or endorse any drug use. He doesn't use alcohol excessively but did drink a lot the night before symptoms started, and alcohol level was 46 on presentation here. He has an abrasion on his forehead and states he fell last night with no loss of consciousness and no headache. He did not drink coffee or take any caffeine pills. He denies fever, chills, cough, congestion, or any other symptoms.  HR was 183 on presentation here and he was given metoprolol and diltiazem IV several time with improvement in HR but he remained in atrial fibrillation.  He given full dose Lovenox and was transferred to the ICU on a diltiazem drip.      Overview/Hospital Course:  Patient was hospitalized in the ICU on a diltiazem drip.  He remained in atrial fibrillation for several hours before converting to NSR several time, then would revert to atrial fibrillation.  By the morning 12/22 he was in NSR and the drip was discontinued and he was started on metoprolol 25 mg bid.    Interval History:  Patient converted to NSR with HR in 70's.  Off diltiazem drip.  Admits to drinking heavily the night before symptoms started, something he does not typically do and his wife at bedside confirms this.    Review of Systems   Constitutional: Negative for chills and fever.   Respiratory: Negative for cough and shortness of breath.    Cardiovascular: Negative for  chest pain, palpitations and leg swelling.     Objective:     Vital Signs (Most Recent):  Temp: 98.2 °F (36.8 °C) (12/22/19 0800)  Pulse: 72 (12/22/19 0900)  Resp: 19 (12/22/19 0855)  BP: 120/81 (12/22/19 0855)  SpO2: 98 % (12/22/19 0900) Vital Signs (24h Range):  Temp:  [97.8 °F (36.6 °C)-98.6 °F (37 °C)] 98.2 °F (36.8 °C)  Pulse:  [] 72  Resp:  [12-22] 19  SpO2:  [96 %-99 %] 98 %  BP: (107-182)/() 120/81     Weight: 87.9 kg (193 lb 12.6 oz)  Body mass index is 28.62 kg/m².    Intake/Output Summary (Last 24 hours) at 12/22/2019 1023  Last data filed at 12/22/2019 0500  Gross per 24 hour   Intake 1005.96 ml   Output 850 ml   Net 155.96 ml      Physical Exam   Constitutional: He is oriented to person, place, and time. He appears well-developed and well-nourished.   HENT:   Head: Normocephalic.   Eyes: Pupils are equal, round, and reactive to light. Conjunctivae are normal.   Neck: Neck supple. No thyromegaly present.   Cardiovascular: Normal rate, regular rhythm, normal heart sounds and intact distal pulses. Exam reveals no gallop and no friction rub.   No murmur heard.  Pulmonary/Chest: Effort normal and breath sounds normal.   Abdominal: Soft. Bowel sounds are normal. He exhibits no distension. There is no tenderness.   Musculoskeletal: Normal range of motion. He exhibits no edema.   Lymphadenopathy:     He has no cervical adenopathy.   Neurological: He is alert and oriented to person, place, and time.   Strength equal and symmetric   Skin: Skin is warm and dry. No rash noted.   Psychiatric: He has a normal mood and affect. His behavior is normal. Thought content normal.       Significant Labs: All pertinent labs within the past 24 hours have been reviewed.    Significant Imaging: I have reviewed all pertinent imaging results/findings within the past 24 hours.      Assessment/Plan:      * Atrial fibrillation with rapid ventricular response  - Patient presented with HR of 202.  Was given IV Lopressor and  Cardizem and transferred to ICU  - Converted to NSR in the morning and drip discontinued.    - Patient on oral metoprolol.  - Await cardiology consult, probably home later today.        VTE Risk Mitigation (From admission, onward)         Ordered     enoxaparin injection 40 mg  Daily      12/22/19 1013     IP VTE HIGH RISK PATIENT  Once      12/21/19 8460                      Carolyn Campbell MD  Department of Hospital Medicine   [facility]

## 2019-12-22 NOTE — PLAN OF CARE
Unable to sleep so Melatonin ordered. Urine toxicology negative. Diltiazem drip infused from 1934 to 2157. Rate controled. Still in A. Fib. Mild HTN at beginning of shift relieved c rest.

## 2019-12-22 NOTE — ASSESSMENT & PLAN NOTE
- Patient presented with HR of 202.  Was given IV Lopressor and Cardizem and transferred to ICU  - Converted to NSR in the morning and drip discontinued.    - Patient on oral metoprolol.  - Await cardiology consult, probably home later today.

## 2019-12-22 NOTE — SUBJECTIVE & OBJECTIVE
History reviewed. No pertinent past medical history.    History reviewed. No pertinent surgical history.    Review of patient's allergies indicates:   Allergen Reactions    Penicillins        No current facility-administered medications on file prior to encounter.      Current Outpatient Medications on File Prior to Encounter   Medication Sig    desvenlafaxine succinate (PRISTIQ) 100 MG Tb24 Take 100 mg by mouth once daily.     Family History     None        Tobacco Use    Smoking status: Never Smoker    Smokeless tobacco: Never Used   Substance and Sexual Activity    Alcohol use: Yes     Comment: socially    Drug use: Never    Sexual activity: Not on file     Review of Systems   Constitutional: Positive for activity change and fatigue. Negative for appetite change and fever.   HENT: Negative for congestion, ear pain and postnasal drip.    Eyes: Negative for discharge.   Respiratory: Negative for apnea, shortness of breath and wheezing.    Cardiovascular: Positive for chest pain and palpitations. Negative for leg swelling.   Gastrointestinal: Negative for abdominal distention, abdominal pain, nausea and vomiting.   Endocrine: Negative for polydipsia, polyphagia and polyuria.   Genitourinary: Negative for difficulty urinating, flank pain, frequency, hematuria and urgency.   Musculoskeletal: Positive for myalgias. Negative for arthralgias and joint swelling.   Skin: Negative for pallor and rash.   Allergic/Immunologic: Negative for environmental allergies and food allergies.   Neurological: Negative for dizziness, speech difficulty, weakness, light-headedness and headaches.   Hematological: Does not bruise/bleed easily.   Psychiatric/Behavioral: Negative for agitation.     Objective:     Vital Signs (Most Recent):  Temp: 98.4 °F (36.9 °C) (12/21/19 1530)  Pulse: 96 (12/21/19 1850)  Resp: 17 (12/21/19 1755)  BP: 126/78 (12/21/19 1850)  SpO2: 97 % (12/21/19 1850) Vital Signs (24h Range):  Temp:  [98.4 °F (36.9  °C)] 98.4 °F (36.9 °C)  Pulse:  [] 96  Resp:  [16-20] 17  SpO2:  [96 %-99 %] 97 %  BP: (107-171)/(58-97) 126/78     Weight: 87.9 kg (193 lb 12.6 oz)  Body mass index is 28.62 kg/m² (pended).    Physical Exam   Constitutional: He is oriented to person, place, and time. He appears well-developed and well-nourished.   HENT:   Head: Normocephalic.   Eyes: Conjunctivae are normal.   Neck: Normal range of motion. Neck supple.   Cardiovascular: Normal heart sounds and intact distal pulses. An irregularly irregular rhythm present. Tachycardia present.   Pulses:       Radial pulses are 2+ on the right side, and 2+ on the left side.   Pulmonary/Chest: Effort normal and breath sounds normal.   Abdominal: Soft. He exhibits no distension. Bowel sounds are decreased. There is no tenderness.   Musculoskeletal: Normal range of motion.   Neurological: He is alert and oriented to person, place, and time. He has normal strength. GCS eye subscore is 4. GCS verbal subscore is 5. GCS motor subscore is 6.   Skin: Skin is warm and dry.   Psychiatric: He has a normal mood and affect. His speech is normal and behavior is normal.           Significant Labs:   CBC:   Recent Labs   Lab 12/21/19  1550   WBC 9.53   HGB 16.6   HCT 48.1        CMP:   Recent Labs   Lab 12/21/19  1550      K 4.1      CO2 22*   *   BUN 17   CREATININE 1.1   CALCIUM 9.7   PROT 7.2   ALBUMIN 4.1   BILITOT 0.4   ALKPHOS 73   AST 31   ALT 61*   ANIONGAP 18*   EGFRNONAA >60       Significant Imaging: I have reviewed all pertinent imaging results/findings within the past 24 hours.

## 2019-12-23 LAB
AORTIC ROOT ANNULUS: 3.44 CM
AORTIC VALVE CUSP SEPERATION: 2.29 CM
AV INDEX (PROSTH): 1.15
AV MEAN GRADIENT: 2 MMHG
AV PEAK GRADIENT: 4 MMHG
AV VALVE AREA: 4.45 CM2
AV VELOCITY RATIO: 1.02
BSA FOR ECHO PROCEDURE: 2.02 M2
CV ECHO LV RWT: 0.71 CM
DOP CALC AO PEAK VEL: 0.94 M/S
DOP CALC AO VTI: 18.19 CM
DOP CALC LVOT AREA: 3.9 CM2
DOP CALC LVOT DIAMETER: 2.22 CM
DOP CALC LVOT PEAK VEL: 0.96 M/S
DOP CALC LVOT STROKE VOLUME: 80.94 CM3
DOP CALCLVOT PEAK VEL VTI: 20.92 CM
E WAVE DECELERATION TIME: 267.22 MSEC
E/A RATIO: 1.02
E/E' RATIO: 5.16 M/S
ECHO LV POSTERIOR WALL: 1.3 CM (ref 0.6–1.1)
FRACTIONAL SHORTENING: 32 % (ref 28–44)
INTERVENTRICULAR SEPTUM: 1.34 CM (ref 0.6–1.1)
LA MAJOR: 5.41 CM
LA MINOR: 5.16 CM
LA WIDTH: 3.84 CM
LEFT ATRIUM SIZE: 3.25 CM
LEFT ATRIUM VOLUME INDEX: 28 ML/M2
LEFT ATRIUM VOLUME: 56.03 CM3
LEFT INTERNAL DIMENSION IN SYSTOLE: 2.5 CM (ref 2.1–4)
LEFT VENTRICLE DIASTOLIC VOLUME INDEX: 28.26 ML/M2
LEFT VENTRICLE DIASTOLIC VOLUME: 56.48 ML
LEFT VENTRICLE MASS INDEX: 84 G/M2
LEFT VENTRICLE SYSTOLIC VOLUME INDEX: 11.2 ML/M2
LEFT VENTRICLE SYSTOLIC VOLUME: 22.33 ML
LEFT VENTRICULAR INTERNAL DIMENSION IN DIASTOLE: 3.66 CM (ref 3.5–6)
LEFT VENTRICULAR MASS: 167.85 G
LV LATERAL E/E' RATIO: 4.45 M/S
LV SEPTAL E/E' RATIO: 6.13 M/S
MV PEAK A VEL: 0.48 M/S
MV PEAK E VEL: 0.49 M/S
PISA TR MAX VEL: 1.81 M/S
PV PEAK VELOCITY: 0.99 CM/S
RA MAJOR: 5.51 CM
RA WIDTH: 3.81 CM
SINUS: 3.16 CM
STJ: 2.64 CM
TDI LATERAL: 0.11 M/S
TDI SEPTAL: 0.08 M/S
TDI: 0.1 M/S
TR MAX PG: 13 MMHG